# Patient Record
Sex: MALE | Race: WHITE | Employment: FULL TIME | URBAN - METROPOLITAN AREA
[De-identification: names, ages, dates, MRNs, and addresses within clinical notes are randomized per-mention and may not be internally consistent; named-entity substitution may affect disease eponyms.]

---

## 2020-02-03 ENCOUNTER — APPOINTMENT (OUTPATIENT)
Dept: CT IMAGING | Facility: CLINIC | Age: 60
End: 2020-02-03
Attending: EMERGENCY MEDICINE
Payer: OTHER MISCELLANEOUS

## 2020-02-03 ENCOUNTER — HOSPITAL ENCOUNTER (EMERGENCY)
Facility: CLINIC | Age: 60
Discharge: HOME | End: 2020-02-03
Attending: EMERGENCY MEDICINE | Admitting: EMERGENCY MEDICINE
Payer: OTHER MISCELLANEOUS

## 2020-02-03 ENCOUNTER — ANCILLARY PROCEDURE (OUTPATIENT)
Dept: ULTRASOUND IMAGING | Facility: CLINIC | Age: 60
End: 2020-02-03
Attending: EMERGENCY MEDICINE

## 2020-02-03 VITALS — OXYGEN SATURATION: 96 % | SYSTOLIC BLOOD PRESSURE: 116 MMHG | HEART RATE: 70 BPM | DIASTOLIC BLOOD PRESSURE: 75 MMHG
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
## 2020-02-03 DIAGNOSIS — K76.0 FATTY LIVER: ICD-10-CM

## 2020-02-03 DIAGNOSIS — S32.009A CLOSED FRACTURE OF TRANSVERSE PROCESS OF LUMBAR VERTEBRA, INITIAL ENCOUNTER (H): ICD-10-CM

## 2020-02-03 LAB
ALBUMIN UR-MCNC: NEGATIVE MG/DL
ANION GAP SERPL CALCULATED.3IONS-SCNC: 6 MMOL/L (ref 3–14)
APPEARANCE UR: CLEAR
BASOPHILS # BLD AUTO: 0.1 10E9/L (ref 0–0.2)
BASOPHILS NFR BLD AUTO: 1.5 %
BILIRUB UR QL STRIP: NEGATIVE
BUN SERPL-MCNC: 11 MG/DL (ref 7–30)
CALCIUM SERPL-MCNC: 9.2 MG/DL (ref 8.5–10.1)
CHLORIDE SERPL-SCNC: 104 MMOL/L (ref 94–109)
CO2 SERPL-SCNC: 24 MMOL/L (ref 20–32)
COLOR UR AUTO: YELLOW
CREAT SERPL-MCNC: 0.58 MG/DL (ref 0.66–1.25)
DIFFERENTIAL METHOD BLD: NORMAL
EOSINOPHIL # BLD AUTO: 0.2 10E9/L (ref 0–0.7)
EOSINOPHIL NFR BLD AUTO: 4 %
ERYTHROCYTE [DISTWIDTH] IN BLOOD BY AUTOMATED COUNT: 13.6 % (ref 10–15)
GFR SERPL CREATININE-BSD FRML MDRD: >90 ML/MIN/{1.73_M2}
GLUCOSE SERPL-MCNC: 270 MG/DL (ref 70–99)
GLUCOSE UR STRIP-MCNC: >1000 MG/DL
HCT VFR BLD AUTO: 45.7 % (ref 40–53)
HGB BLD-MCNC: 15.7 G/DL (ref 13.3–17.7)
HGB UR QL STRIP: NEGATIVE
IMM GRANULOCYTES # BLD: 0 10E9/L (ref 0–0.4)
IMM GRANULOCYTES NFR BLD: 0.3 %
KETONES UR STRIP-MCNC: NEGATIVE MG/DL
LEUKOCYTE ESTERASE UR QL STRIP: NEGATIVE
LYMPHOCYTES # BLD AUTO: 1.7 10E9/L (ref 0.8–5.3)
LYMPHOCYTES NFR BLD AUTO: 27.5 %
MCH RBC QN AUTO: 29.8 PG (ref 26.5–33)
MCHC RBC AUTO-ENTMCNC: 34.4 G/DL (ref 31.5–36.5)
MCV RBC AUTO: 87 FL (ref 78–100)
MONOCYTES # BLD AUTO: 0.6 10E9/L (ref 0–1.3)
MONOCYTES NFR BLD AUTO: 10.6 %
MUCOUS THREADS #/AREA URNS LPF: PRESENT /LPF
NEUTROPHILS # BLD AUTO: 3.4 10E9/L (ref 1.6–8.3)
NEUTROPHILS NFR BLD AUTO: 56.1 %
NITRATE UR QL: NEGATIVE
NRBC # BLD AUTO: 0 10*3/UL
NRBC BLD AUTO-RTO: 0 /100
PH UR STRIP: 5.5 PH (ref 5–7)
PLATELET # BLD AUTO: 170 10E9/L (ref 150–450)
POTASSIUM SERPL-SCNC: 4.3 MMOL/L (ref 3.4–5.3)
RBC # BLD AUTO: 5.27 10E12/L (ref 4.4–5.9)
RBC #/AREA URNS AUTO: <1 /HPF (ref 0–2)
SODIUM SERPL-SCNC: 134 MMOL/L (ref 133–144)
SOURCE: ABNORMAL
SP GR UR STRIP: 1.03 (ref 1–1.03)
TROPONIN I SERPL-MCNC: <0.015 UG/L (ref 0–0.04)
UROBILINOGEN UR STRIP-MCNC: NORMAL MG/DL (ref 0–2)
WBC # BLD AUTO: 6 10E9/L (ref 4–11)
WBC #/AREA URNS AUTO: 2 /HPF (ref 0–5)

## 2020-02-03 PROCEDURE — 84484 ASSAY OF TROPONIN QUANT: CPT | Performed by: EMERGENCY MEDICINE

## 2020-02-03 PROCEDURE — 80048 BASIC METABOLIC PNL TOTAL CA: CPT | Performed by: EMERGENCY MEDICINE

## 2020-02-03 PROCEDURE — 93005 ELECTROCARDIOGRAM TRACING: CPT

## 2020-02-03 PROCEDURE — 81001 URINALYSIS AUTO W/SCOPE: CPT | Performed by: EMERGENCY MEDICINE

## 2020-02-03 PROCEDURE — 25000128 H RX IP 250 OP 636: Performed by: EMERGENCY MEDICINE

## 2020-02-03 PROCEDURE — 70450 CT HEAD/BRAIN W/O DYE: CPT

## 2020-02-03 PROCEDURE — 96374 THER/PROPH/DIAG INJ IV PUSH: CPT

## 2020-02-03 PROCEDURE — 99285 EMERGENCY DEPT VISIT HI MDM: CPT | Mod: 25

## 2020-02-03 PROCEDURE — 25000132 ZZH RX MED GY IP 250 OP 250 PS 637: Performed by: EMERGENCY MEDICINE

## 2020-02-03 PROCEDURE — 74177 CT ABD & PELVIS W/CONTRAST: CPT

## 2020-02-03 PROCEDURE — 76705 ECHO EXAM OF ABDOMEN: CPT

## 2020-02-03 PROCEDURE — 85025 COMPLETE CBC W/AUTO DIFF WBC: CPT | Performed by: EMERGENCY MEDICINE

## 2020-02-03 PROCEDURE — 96376 TX/PRO/DX INJ SAME DRUG ADON: CPT

## 2020-02-03 RX ORDER — LIDOCAINE 4 G/G
2 PATCH TOPICAL ONCE
Status: DISCONTINUED | OUTPATIENT
Start: 2020-02-03 | End: 2020-02-03 | Stop reason: HOSPADM

## 2020-02-03 RX ORDER — LIDOCAINE 40 MG/G
CREAM TOPICAL
Status: DISCONTINUED | OUTPATIENT
Start: 2020-02-03 | End: 2020-02-03 | Stop reason: HOSPADM

## 2020-02-03 RX ORDER — ONDANSETRON 2 MG/ML
4 INJECTION INTRAMUSCULAR; INTRAVENOUS EVERY 30 MIN PRN
Status: DISCONTINUED | OUTPATIENT
Start: 2020-02-03 | End: 2020-02-03 | Stop reason: HOSPADM

## 2020-02-03 RX ORDER — ACETAMINOPHEN 500 MG
500-1000 TABLET ORAL EVERY 8 HOURS PRN
Qty: 1 TABLET | Refills: 0 | Status: SHIPPED | OUTPATIENT
Start: 2020-02-03 | End: 2020-02-13

## 2020-02-03 RX ORDER — HYDROMORPHONE HYDROCHLORIDE 1 MG/ML
0.5 INJECTION, SOLUTION INTRAMUSCULAR; INTRAVENOUS; SUBCUTANEOUS ONCE
Status: COMPLETED | OUTPATIENT
Start: 2020-02-03 | End: 2020-02-03

## 2020-02-03 RX ORDER — IOPAMIDOL 755 MG/ML
100 INJECTION, SOLUTION INTRAVASCULAR ONCE
Status: COMPLETED | OUTPATIENT
Start: 2020-02-03 | End: 2020-02-03

## 2020-02-03 RX ORDER — SODIUM CHLORIDE 9 MG/ML
1000 INJECTION, SOLUTION INTRAVENOUS CONTINUOUS
Status: DISCONTINUED | OUTPATIENT
Start: 2020-02-03 | End: 2020-02-03 | Stop reason: HOSPADM

## 2020-02-03 RX ORDER — OXYCODONE HYDROCHLORIDE 5 MG/1
5 TABLET ORAL ONCE
Status: COMPLETED | OUTPATIENT
Start: 2020-02-03 | End: 2020-02-03

## 2020-02-03 RX ORDER — ACETAMINOPHEN 500 MG
1000 TABLET ORAL ONCE
Status: COMPLETED | OUTPATIENT
Start: 2020-02-03 | End: 2020-02-03

## 2020-02-03 RX ORDER — OXYCODONE HYDROCHLORIDE 5 MG/1
5 TABLET ORAL EVERY 6 HOURS PRN
Qty: 20 TABLET | Refills: 0 | Status: SHIPPED | OUTPATIENT
Start: 2020-02-03

## 2020-02-03 RX ORDER — OXYCODONE HYDROCHLORIDE 5 MG/1
10 TABLET ORAL ONCE
Status: DISCONTINUED | OUTPATIENT
Start: 2020-02-03 | End: 2020-02-03

## 2020-02-03 RX ORDER — HYDROMORPHONE HYDROCHLORIDE 1 MG/ML
0.5 INJECTION, SOLUTION INTRAMUSCULAR; INTRAVENOUS; SUBCUTANEOUS
Status: COMPLETED | OUTPATIENT
Start: 2020-02-03 | End: 2020-02-03

## 2020-02-03 RX ADMIN — HYDROMORPHONE HYDROCHLORIDE 0.5 MG: 1 INJECTION, SOLUTION INTRAMUSCULAR; INTRAVENOUS; SUBCUTANEOUS at 11:08

## 2020-02-03 RX ADMIN — LIDOCAINE 2 PATCH: 560 PATCH PERCUTANEOUS; TOPICAL; TRANSDERMAL at 14:13

## 2020-02-03 RX ADMIN — IOPAMIDOL 100 ML: 755 INJECTION, SOLUTION INTRAVENOUS at 13:05

## 2020-02-03 RX ADMIN — ACETAMINOPHEN 1000 MG: 500 TABLET, FILM COATED ORAL at 14:13

## 2020-02-03 RX ADMIN — HYDROMORPHONE HYDROCHLORIDE 0.5 MG: 1 INJECTION, SOLUTION INTRAMUSCULAR; INTRAVENOUS; SUBCUTANEOUS at 14:13

## 2020-02-03 RX ADMIN — OXYCODONE HYDROCHLORIDE 5 MG: 5 TABLET ORAL at 14:13

## 2020-02-03 ASSESSMENT — ENCOUNTER SYMPTOMS
BACK PAIN: 1
HEADACHES: 1
LIGHT-HEADEDNESS: 0
NECK PAIN: 0
ABDOMINAL PAIN: 0

## 2020-02-03 NOTE — ED PROVIDER NOTES
History   Chief Complaint:  Back Pain     The history is provided by the EMS personnel and the patient.      Jus Garcia is a 59 year old male with history of type 2 diabetes, obesity, and hypertension who presents with back pain. EMS states the patient was at work cleaning up trash outside and stepped off the curb onto a patch of ice. He fell hitting his lower right back on the curb. EMS states he had a mild period of possible loss of consciousness. He is unsure if he hit his head but denies any lightheadedness or change in sensation prior to falling or incontinence after the fall. The patient initially denies a headache but states he is developing one now. He denies use of blood thinners. The patient has been able to walk since the injury as he walked to the truck though he endorses some leg pain.  He denies chest pain, abdominal pain, shortness of breath, or neck pain.  After getting into the ambulance, EMS reports the patient had a period where he was more difficult to arouse, but once he was arousable he was alert and oriented. Blood sugar was 285 and the remainder of his vitals were normal including the time when he was more difficult to rouse. Of note, the patient reports that he uses the Tramadol to treat his chronic foot pain.    Allergies:  Pneumococcal Vaccine    Medications:   Basaglar  Atorvastatin  Metformin  Zoloft  Lisinopril  Asprin 81 mg   Glipizide  Lorazepam   Tramadol   Liraglutide injection     Past Medical History:    Type 2 diabetes   Obesity   Nonalcoholic steatohepatitis  Umbilical hernia  Male erectile dysfunction  Obstructive sleep apnea  Hypertension    Past Surgical History:    [place] teeth extraction  Cardiac coronary arteries dual read    Family History:    Brother: diabetes  Mother: cancer, diabetes, heart disease    Social History:  This was a work related injury.   PCP: Mak Fuentes MD    Review of Systems   Cardiovascular: Negative for chest pain.   Gastrointestinal:  Negative for abdominal pain.   Musculoskeletal: Positive for back pain. Negative for neck pain.   Neurological: Positive for headaches. Negative for light-headedness.        Possible loss of consciousness. No incontinence.    All other systems reviewed and are negative.    Physical Exam     Patient Vitals for the past 24 hrs:   BP Pulse Heart Rate SpO2   02/03/20 1515 116/75 70 -- 96 %   02/03/20 1445 120/77 70 -- 96 %   02/03/20 1430 121/76 73 -- 96 %   02/03/20 1415 125/83 71 -- 97 %   02/03/20 1400 125/84 68 -- 97 %   02/03/20 1345 119/79 71 -- 99 %   02/03/20 1330 128/82 66 -- 99 %   02/03/20 1315 -- -- -- 97 %   02/03/20 1215 136/79 66 -- 99 %   02/03/20 1200 (!) 158/104 69 -- 98 %   02/03/20 1145 -- -- -- 95 %   02/03/20 1130 -- -- -- 95 %   02/03/20 1115 -- -- -- 97 %   02/03/20 1103 135/73 71 71 99 %   02/03/20 1100 135/73 70 -- 100 %       Physical Exam  Nursing note and vitals reviewed.    Constitutional: Pleasant and well groomed.          HENT:    Mouth/Throat: Oropharynx is without swelling or erythema. Oral mucosa moist.    Eyes: Conjunctivae are normal. No scleral icterus.    Neck: Neck supple.   Cardiovascular: Normal rate, regular rhythm and intact distal pulses.    Pulmonary/Chest: Effort normal and breath sounds normal. Right gerard-lateral chest wall tenderness. No overlying skin changes.   Abdominal: Soft.  No distension. Right upper quadrant tenderness.  Musculoskeletal:  No edema, No calf tenderness. Distal pulses and light touch sensation intact. Lower extremity strength intact.  Upper extremity strength and light touch sensation intact. Right lumbar paraspinal muscle tenderness. No midline tenderness.  Neurological:Alert and oriented x3. Coordination normal.   Skin: Skin is warm and dry.   Psychiatric: Normal mood and affect.      Emergency Department Course   ECG:  ECG taken at 1107, ECG read at 1120  Sinus rhythm with 1st degree AV block  Left axis deviation  Abnormal ECG  Rate 72 bpm. WY  interval 232 ms. QRS duration 108 ms. QT/QTc 378/413 ms. P-R-T axes 61 -35 25.    Imaging:  Radiology findings were communicated with the patient who voiced understanding of the findings.    CT Chest/Abdomen/Pelvis w Contrast  1. Minimally displaced fractures of the right transverse processes of  L1, L2, L3, and L4.  2. No other acute traumatic abnormalities are demonstrated.  3. Fatty infiltration of the liver.  Reading per radiology     Head CT w/o contrast  1. No evidence for intracranial hemorrhage or any acute brain  pathology.  2. Opacified left frontal sinus and opacification of a single  right-sided ethmoid air cell noted.   Reading per radiology     POC US ABDOMEN LIMITED  Preliminary Result  Bedside US: FAST  Indication: Abdominal pain, Trauma  ED Limited Bedside Screening Ultrasound performed by Jacqueline Pierre MD  View: Hepatorenal, Splenorenal, Suprapubic, Cardiac  Findings: No peritoneal free fluid. No pericardial effusion  Impression: No free fluid. No pericardial effusion  Images were  saved to the hard drive     Laboratory:  Laboratory findings were communicated with the patient who voiced understanding of the findings.    UA with microscopic: glucose >1000(H), mucous present o/w WNL     CBC: WBC 6.0, HGB 15.7,   BMP: glucose 270(H), Creatinine 0.58(L) o/w WNL   Troponin (Collected 1114): <0.015     Interventions:  1108 Dilaudid 0.5 mg IV  1413 Lidocaine 4% patch 2 patches transdermal  1413 Tylenol 1000 mg Oral  1413 Dilaudid 0.5 mg IV  1413 Oxycodone 5 mg Oral    Emergency Department Course:  Nursing notes and vitals reviewed.    1056 EMS arrival and report.    1100 I performed an exam of the patient as documented above.     IV was inserted and blood was drawn for laboratory testing, results above.     1122 I returned to check on the patient.     1138 I preformed a bedside ultrasound on the patient as noted above.     The patient provided a urine sample here in the emergency department.  This was sent for laboratory testing, findings above.     The patient was sent for a CT Head and CT Chest/Abdomen/Pelvis while in the emergency department, results above.      1328 I returned to update the patient on his findings and answered all questions.    1403 I received a call from Riaz Grande the occupational medicine doctor from the patient's work. I went to speak to the patient who verbally consented that I could speak with them. I then updated Riaz on the patient's findings today.     1550 I returned to check on the patient. He is able to ambulate with a walker and believes he will be able to do well at home.     Findings and plan explained to the Patient. Patient discharged home with instructions regarding supportive care, medications, and reasons to return. The importance of close follow-up was reviewed. The patient was prescribed Tylenol and Oxycodone.     Impression & Plan    Medical Decision Making:  Jus Garcia is a 59 year old male who presents to the emergency department with back pain after falling on ice as described above. There is a questionable loss of consciousness. He had no vomiting and mild headache on arrival. Also was noted to have abdominal tenderness and lower chest wall tenderness. He was neurologically intact. CT of the chest abdomen pelvis revealed the transverse process fractures but no other acute findings. CT of the head was negative. Urine did not show any hematuria. With pain medication, the patient was able to ambulate with a walker. With reasonable clinical certainty, I feel that he is safe for discharge home with ongoing evaluation and management as an outpatient. He will be referred to [facility] for ongoing evaluation and management, He received standard narcotic precautions. He understands to return for new or worsening symptoms.    Diagnosis:    ICD-10-CM    1. Closed fracture of transverse process of lumbar vertebra, initial encounter (H) S32.009A     2. Fall due to slipping on ice or snow, initial encounter W00.9XXA    3. Fatty liver K76.0        Disposition:   discharged to home    Discharge Medications:  Discharge Medication List as of 2/3/2020  4:13 PM      START taking these medications    Details   acetaminophen (TYLENOL) 500 MG tablet Take 1-2 tablets (500-1,000 mg) by mouth every 8 hours as needed for mild pain (DO NOT FILL! For dosing only.) DO NOT FILL!   For Dosing Only, Disp-1 tablet, R-0, Local Print      oxyCODONE (ROXICODONE) 5 MG tablet Take 1 tablet (5 mg) by mouth every 6 hours as needed for pain, Disp-20 tablet, R-0, Local Print             Scribe Disclosure:  I, Stefani Romano, am serving as a scribe at 11:00 AM on 2/3/2020 to document services personally performed by Jacqueline Pierre MD based on my observations and the provider's statements to me.   [facility] EMERGENCY DEPARTMENT       Jacqueline Pierre MD  02/03/20 2050

## 2020-02-03 NOTE — ED TRIAGE NOTES
Pt stepped of a curb and slipped on  Ice, falling back and hit lower back on the curb.  Pt thinks he had a + LOC.  Denies neck or upper back pain.  Per ems had a periods of ? Unresponsiveness in ambulance, was breathing but difficult to arouse for a short period.  No incontinence.

## 2020-02-03 NOTE — ED AVS SNAPSHOT
[facility] Emergency Department  [address]  [facility] [address]  Phone:  [phone]  Fax:  [phone]                                    Jus Garcia   MRN: [id number]    Department:  [facility] Emergency Department   Date of Visit:  2/3/2020           After Visit Summary Signature Page    I have received my discharge instructions, and my questions have been answered. I have discussed any challenges I see with this plan with the nurse or doctor.    ..........................................................................................................................................  Patient/Patient Representative Signature      ..........................................................................................................................................  Patient Representative Print Name and Relationship to Patient    ..................................................               ................................................  Date                                   Time    ..........................................................................................................................................  Reviewed by Signature/Title    ...................................................              ..............................................  Date                                               Time          22EPIC Rev 08/18

## 2020-02-03 NOTE — ED NOTES
Back from CT, states pain is 6/10 but is ok with pain, states I don't want to take too many mends.

## 2020-02-03 NOTE — ED NOTES
Bed: ED30  Expected date: 2/3/20  Expected time: 10:47 AM  Means of arrival: Ambulance  Comments:  BV3

## 2020-02-03 NOTE — DISCHARGE INSTRUCTIONS
Opioid Medication Discharge Instructions    You have been given a prescription for an opioid (narcotic) pain medicine and/or have   received a pain medicine while here in the emergency department. These medicines can make you drowsy or impaired.     You must not drive, operate dangerous equipment, or   engage in any other dangerous activities while taking these medications. If you drive while taking these medications, you could be arrested for DUI, or driving under the   influence. Do not drink any alcohol while you are taking these medications.     Opioid pain medications can cause addiction. If you have a history of chemical   dependency of any type, you are at a higher risk of becoming addicted to pain   medications. Only take these prescribed medications to treat your pain when all other   options have been tried. Take it for as short a time and as few doses as possible.     Store your pain pills in a secure place, as they are frequently stolen and provide a dangerous opportunity for children or visitors in your house to start abusing these powerful medications. We will not replace any lost or stolen medicine.     As soon as your pain is better, you should safely dispose of all your remaining medication.     Many prescription pain medications contain Tylenol (acetaminophen), including Vicodin, Tylenol #3, Norco, Lortab, and Percocet. You should not take any extra pills of Tylenol if you are using these prescription medications or you can get very sick. Do not ever take more than 4000 mg of acetaminophen in any 24 hour period.    All opioids tend to cause constipation. Drink plenty of water and eat foods that have   a lot of fiber, such as fruits, vegetables, prune juice, apple juice and high fiber cereal.   Take a laxative if you don t move your bowels at least every other day. Miralax, Milk of   Magnesia, Colace, or Senna can be used to keep you regular.

## 2020-02-04 LAB — INTERPRETATION ECG - MUSE: NORMAL
